# Patient Record
Sex: FEMALE | Race: WHITE | URBAN - METROPOLITAN AREA
[De-identification: names, ages, dates, MRNs, and addresses within clinical notes are randomized per-mention and may not be internally consistent; named-entity substitution may affect disease eponyms.]

---

## 2018-08-06 ENCOUNTER — OFFICE VISIT (OUTPATIENT)
Dept: FAMILY MEDICINE CLINIC | Age: 23
End: 2018-08-06

## 2018-08-06 VITALS
DIASTOLIC BLOOD PRESSURE: 85 MMHG | HEART RATE: 82 BPM | WEIGHT: 230 LBS | OXYGEN SATURATION: 99 % | TEMPERATURE: 98.1 F | BODY MASS INDEX: 45.16 KG/M2 | HEIGHT: 60 IN | RESPIRATION RATE: 14 BRPM | SYSTOLIC BLOOD PRESSURE: 118 MMHG

## 2018-08-06 DIAGNOSIS — F41.9 ANXIETY: ICD-10-CM

## 2018-08-06 DIAGNOSIS — Z00.00 WELL ADULT EXAM: Primary | ICD-10-CM

## 2018-08-06 PROBLEM — E66.01 OBESITY, MORBID (HCC): Status: ACTIVE | Noted: 2018-08-06

## 2018-08-06 RX ORDER — ESCITALOPRAM OXALATE 10 MG/1
10 TABLET ORAL DAILY
Qty: 30 TAB | Refills: 5 | Status: SHIPPED | OUTPATIENT
Start: 2018-08-06

## 2018-08-06 NOTE — MR AVS SNAPSHOT
[address] [address] 
[phone] Patient: Linda Cerrato MRN: [id number] Visit Information Date & Time Provider Department Dept. Phone Encounter #  
 8/6/2018 11:00 AM David Walter, [address] [facility] [phone] [id number] Follow-up Instructions Return in about 3 weeks (around 8/27/2018) for med check. Upcoming Health Maintenance Date Due  
 HPV Age 9Y-34Y (1 of 1 - Female 3 Dose Series) 9/24/2006 DTaP/Tdap/Td series (1 - Tdap) 9/24/2016 PAP AKA CERVICAL CYTOLOGY 9/24/2016 Influenza Age 5 to Adult 8/1/2018 Allergies as of 8/6/2018  Review Complete On: 8/6/2018 By: Pam De La Rosa Severity Noted Reaction Type Reactions Bee Venom Protein (Honey Bee) High 08/06/2018    Anaphylaxis Nystatin High 08/06/2018    Anaphylaxis Sac High 08/06/2018    Anaphylaxis Current Immunizations  Never Reviewed No immunizations on file. Not reviewed this visit You Were Diagnosed With   
  
 Codes Comments Well adult exam    -  Primary ICD-10-CM: Z00.00 ICD-9-CM: V70.0 Anxiety     ICD-10-CM: F41.9 ICD-9-CM: 300.00 Vitals BP Pulse Temp Resp Height(growth percentile) Weight(growth percentile) 118/85 (BP 1 Location: Left arm, BP Patient Position: Sitting) 82 98.1 °F (36.7 °C) (Oral) 14 5' (1.524 m) 230 lb (104.3 kg) SpO2 BMI OB Status Smoking Status 99% 44.92 kg/m2 IUD Never Smoker Vitals History BMI and BSA Data Body Mass Index Body Surface Area 44.92 kg/m 2 2.1 m 2 Preferred Pharmacy Pharmacy Name Phone [id number], [address] [phone] Your Updated Medication List  
  
   
This list is accurate as of 8/6/18 11:50 AM.  Always use your most recent med list.  
  
  
  
  
 escitalopram oxalate 10 mg tablet Commonly known as:  Genesis Regalado Take 1 Tab by mouth daily. Prescriptions Sent to Pharmacy Refills  
 escitalopram oxalate (LEXAPRO) 10 mg tablet 5 Sig: Take 1 Tab by mouth daily. Class: Normal  
 Pharmacy: [address] Ph #: [phone] Route: Oral  
  
We Performed the Following CBC WITH AUTOMATED DIFF [17909 CPT(R)] LIPID PANEL [22970 CPT(R)] METABOLIC PANEL, COMPREHENSIVE [28986 CPT(R)] TSH 3RD GENERATION [22450 CPT(R)] Follow-up Instructions Return in about 3 weeks (around 8/27/2018) for med check. Introducing [facility]! Wanda Linares introduces [employer] patient portal. Now you can access parts of your medical record, email your doctor's office, and request medication refills online. 1. In your internet browser, go to [email or web address] 2. Click on the First Time User? Click Here link in the Sign In box. You will see the New Member Sign Up page. 3. Enter your [employer] Access Code exactly as it appears below. You will not need to use this code after youve completed the sign-up process. If you do not sign up before the expiration date, you must request a new code. · [employer] Access Code: [email or web address] Expires: 11/4/2018 10:38 AM 
 
4. Enter the last four digits of your Social Security Number (xxxx) and Date of Birth (mm/dd/yyyy) as indicated and click Submit. You will be taken to the next sign-up page. 5. Create a [employer] ID. This will be your [employer] login ID and cannot be changed, so think of one that is secure and easy to remember. 6. Create a [employer] password. You can change your password at any time. 7. Enter your Password Reset Question and Answer. This can be used at a later time if you forget your password. 8. Enter your e-mail address. You will receive e-mail notification when new information is available in [address] 9. Click Sign Up. You can now view and download portions of your medical record. 10. Click the Download Summary menu link to download a portable copy of your medical information. If you have questions, please visit the Frequently Asked Questions section of the [employer] website. Remember, [employer] is NOT to be used for urgent needs. For medical emergencies, dial 911. Now available from your iPhone and Android! Please provide this summary of care documentation to your next provider. Your primary care clinician is listed as [facility]. If you have any questions after today's visit, please call [phone].

## 2018-08-06 NOTE — PROGRESS NOTES
Chief Complaint   Patient presents with   [facility] Establish Care     1. Have you been to the ER, urgent care clinic since your last visit? Hospitalized since your last visit? Yes, seen in the [facility] ER for left ear problem about a year ago. 2. Have you seen or consulted any other health care providers outside of the [facility] since your last visit? Include any pap smears or colon screening.  No

## 2018-08-06 NOTE — PROGRESS NOTES
Subjective:   25 y.o. female for Well Woman Check. She is new to the practice today. Her gyne and breast care is done elsewhere by her Ob-Gyne physician. Patient Active Problem List    Diagnosis Date Noted    Obesity, morbid ([address] 08/06/2018     Current Outpatient Prescriptions   Medication Sig Dispense Refill    escitalopram oxalate (LEXAPRO) 10 mg tablet Take 1 Tab by mouth daily. 27 Tab 5     Family History   Problem Relation Age of Onset    Hypertension Sister     High Cholesterol Sister     Diabetes Maternal Aunt     Diabetes Maternal Grandfather      Social History   Substance Use Topics    Smoking status: Never Smoker    Smokeless tobacco: Never Used    Alcohol use Yes      Comment: rarely        ROS: Feeling generally well. No TIA's or unusual headaches, no dysphagia. No prolonged cough. No dyspnea or chest pain on exertion. No abdominal pain, change in bowel habits, black or bloody stools. No urinary tract symptoms. No new or unusual musculoskeletal symptoms. Specific concerns today: long term anxiety, used to see Dr. Yue Gonzalez as child, only given benzo type meds, anxiety is bad, dont like to go out by herself. Objective: The patient appears well, alert, oriented x 3, in no distress. Visit Vitals    /85 (BP 1 Location: Left arm, BP Patient Position: Sitting)    Pulse 82    Temp 98.1 °F (36.7 °C) (Oral)    Resp 14    Ht 5' (1.524 m)    Wt 230 lb (104.3 kg)    SpO2 99%    BMI 44.92 kg/m2     ENT normal.  Neck supple. No adenopathy or thyromegaly. ROSSY. Lungs are clear, good air entry, no wheezes, rhonchi or rales. S1 and S2 normal, no murmurs, regular rate and rhythm. Abdomen soft without tenderness, guarding, mass or organomegaly. Extremities show no edema, normal peripheral pulses. Neurological is normal, no focal findings. Breast and Pelvic exams are deferred.     Assessment/Plan:   Well Woman  lose weight, increase physical activity, follow low fat diet, follow low salt diet, routine labs ordered  Encounter Diagnoses   Name Primary?  Well adult exam Yes    Anxiety      Orders Placed This Encounter    CBC WITH AUTOMATED DIFF    LIPID PANEL    METABOLIC PANEL, COMPREHENSIVE    TSH 3RD GENERATION    escitalopram oxalate (LEXAPRO) 10 mg tablet     Labs updated   Start lexapro 10mg a day  Reviewed how to take and what to expect, recheck 3 weeks    I have discussed the diagnosis with the patient and the intended plan as seen in the above orders. The patient has received an after-visit summary and questions were answered concerning future plans. Patient conveyed understanding of the plan at the time of the visit.     Katja Keating, MSN, ANP  8/6/2018

## 2018-08-07 LAB
ALBUMIN SERPL-MCNC: 4.6 G/DL (ref 3.5–5.5)
ALBUMIN/GLOB SERPL: 1.7 {RATIO} (ref 1.2–2.2)
ALP SERPL-CCNC: 103 IU/L (ref 39–117)
ALT SERPL-CCNC: 9 IU/L (ref 0–32)
AST SERPL-CCNC: 16 IU/L (ref 0–40)
BASOPHILS # BLD AUTO: 0.1 X10E3/UL (ref 0–0.2)
BASOPHILS NFR BLD AUTO: 1 %
BILIRUB SERPL-MCNC: 0.4 MG/DL (ref 0–1.2)
BUN SERPL-MCNC: 11 MG/DL (ref 6–20)
BUN/CREAT SERPL: 16 (ref 9–23)
CALCIUM SERPL-MCNC: 9.5 MG/DL (ref 8.7–10.2)
CHLORIDE SERPL-SCNC: 100 MMOL/L (ref 96–106)
CHOLEST SERPL-MCNC: 205 MG/DL (ref 100–199)
CO2 SERPL-SCNC: 22 MMOL/L (ref 20–29)
CREAT SERPL-MCNC: 0.67 MG/DL (ref 0.57–1)
EOSINOPHIL # BLD AUTO: 0 X10E3/UL (ref 0–0.4)
EOSINOPHIL NFR BLD AUTO: 0 %
ERYTHROCYTE [DISTWIDTH] IN BLOOD BY AUTOMATED COUNT: 12.9 % (ref 12.3–15.4)
GLOBULIN SER CALC-MCNC: 2.7 G/DL (ref 1.5–4.5)
GLUCOSE SERPL-MCNC: 86 MG/DL (ref 65–99)
HCT VFR BLD AUTO: 44.4 % (ref 34–46.6)
HDLC SERPL-MCNC: 50 MG/DL
HGB BLD-MCNC: 14.7 G/DL (ref 11.1–15.9)
IMM GRANULOCYTES # BLD: 0 X10E3/UL (ref 0–0.1)
IMM GRANULOCYTES NFR BLD: 0 %
INTERPRETATION, 910389: NORMAL
LDLC SERPL CALC-MCNC: 128 MG/DL (ref 0–99)
LYMPHOCYTES # BLD AUTO: 2.8 X10E3/UL (ref 0.7–3.1)
LYMPHOCYTES NFR BLD AUTO: 30 %
MCH RBC QN AUTO: 30.1 PG (ref 26.6–33)
MCHC RBC AUTO-ENTMCNC: 33.1 G/DL (ref 31.5–35.7)
MCV RBC AUTO: 91 FL (ref 79–97)
MONOCYTES # BLD AUTO: 0.7 X10E3/UL (ref 0.1–0.9)
MONOCYTES NFR BLD AUTO: 7 %
NEUTROPHILS # BLD AUTO: 5.7 X10E3/UL (ref 1.4–7)
NEUTROPHILS NFR BLD AUTO: 62 %
PLATELET # BLD AUTO: 296 X10E3/UL (ref 150–379)
POTASSIUM SERPL-SCNC: 4.3 MMOL/L (ref 3.5–5.2)
PROT SERPL-MCNC: 7.3 G/DL (ref 6–8.5)
RBC # BLD AUTO: 4.89 X10E6/UL (ref 3.77–5.28)
SODIUM SERPL-SCNC: 138 MMOL/L (ref 134–144)
TRIGL SERPL-MCNC: 137 MG/DL (ref 0–149)
TSH SERPL DL<=0.005 MIU/L-ACNC: 1.32 UIU/ML (ref 0.45–4.5)
VLDLC SERPL CALC-MCNC: 27 MG/DL (ref 5–40)
WBC # BLD AUTO: 9.3 X10E3/UL (ref 3.4–10.8)

## 2018-08-28 ENCOUNTER — OFFICE VISIT (OUTPATIENT)
Dept: FAMILY MEDICINE CLINIC | Age: 23
End: 2018-08-28

## 2018-08-28 VITALS
SYSTOLIC BLOOD PRESSURE: 116 MMHG | TEMPERATURE: 98.5 F | HEART RATE: 97 BPM | RESPIRATION RATE: 18 BRPM | HEIGHT: 60 IN | OXYGEN SATURATION: 100 % | DIASTOLIC BLOOD PRESSURE: 78 MMHG | BODY MASS INDEX: 45.9 KG/M2 | WEIGHT: 233.8 LBS

## 2018-08-28 DIAGNOSIS — F41.9 ANXIETY: Primary | ICD-10-CM

## 2018-08-28 NOTE — MR AVS SNAPSHOT
[address] [address] 
[phone] Patient: Betty Granado MRN: [id number] [id number]/93/[id number] Visit Information Date & Time Provider Department Dept. Phone Encounter #  
 8/28/2018  3:45 PM Chet Shelton, [address] [phone] [id number] Upcoming Health Maintenance Date Due  
 HPV Age 9Y-34Y (1 of 1 - Female 3 Dose Series) 9/24/2006 DTaP/Tdap/Td series (1 - Tdap) 9/24/2016 PAP AKA CERVICAL CYTOLOGY 9/24/2016 Influenza Age 5 to Adult 8/1/2018 Allergies as of 8/28/2018  Review Complete On: 8/28/2018 By: Dana Wesley LPN Severity Noted Reaction Type Reactions Bee Venom Protein (Honey Bee) High 08/06/2018    Anaphylaxis Nystatin High 08/06/2018    Anaphylaxis Swisher High 08/06/2018    Anaphylaxis Current Immunizations  Never Reviewed No immunizations on file. Not reviewed this visit You Were Diagnosed With   
  
 Codes Comments Anxiety    -  Primary ICD-10-CM: F41.9 ICD-9-CM: 300.00 Vitals BP Pulse Temp Resp Height(growth percentile) Weight(growth percentile) 116/78 (BP 1 Location: Left arm, BP Patient Position: Sitting) 97 98.5 °F (36.9 °C) (Oral) 18 5' (1.524 m) 233 lb 12.8 oz (106.1 kg) SpO2 BMI OB Status Smoking Status 100% 45.66 kg/m2 IUD Never Smoker BMI and BSA Data Body Mass Index Body Surface Area  
 45.66 kg/m 2 2.12 m 2 Preferred Pharmacy Pharmacy Name Phone [id number], [address] [facility] [phone] Your Updated Medication List  
  
   
This list is accurate as of 8/28/18  4:09 PM.  Always use your most recent med list.  
  
  
  
  
 escitalopram oxalate 10 mg tablet Commonly known as:  Elian Reynolds Take 1 Tab by mouth daily. Introducing [facility]! Dear Xiao: 
Thank you for requesting a [employer] account.   Our records indicate that you already have an active [employer] account. You can access your account anytime at [email or web address] Did you know that you can access your hospital and ER discharge instructions at any time in [employer]? You can also review all of your test results from your hospital stay or ER visit. Additional Information If you have questions, please visit the Frequently Asked Questions section of the [employer] website at [email or web address]/. Remember, [employer] is NOT to be used for urgent needs. For medical emergencies, dial 911. Now available from your iPhone and Android! Please provide this summary of care documentation to your next provider. Your primary care clinician is listed as Jessica Black. If you have any questions after today's visit, please call [phone].

## 2018-08-28 NOTE — PROGRESS NOTES
Rosa Colon is a 25 y.o. female    Chief Complaint   Patient presents with    Follow-up     3 week for new medication       1. Have you been to the ER, urgent care clinic since your last visit? Hospitalized since your last visit? No    2. Have you seen or consulted any other health care providers outside of the [address] [place] [address] since your last visit? Include any pap smears or colon screening.  No

## 2018-08-29 NOTE — PROGRESS NOTES
HISTORY OF PRESENT ILLNESS  Humble Knight is a 25 y.o. female. HPI  Started lexapro 10mg last visit  Does feel it has helped with sleep however must take it at night due to fatigue  Still having anxiety  This mostly stems from marriage and  who has been [place]  She would like to find counselor to help with her PTSD type sx    ROS  A comprehensive review of system was obtained and negative except findings in the HPI    Visit Vitals    /78 (BP 1 Location: Left arm, BP Patient Position: Sitting)    Pulse 97    Temp 98.5 °F (36.9 °C) (Oral)    Resp 18    Ht 5' (1.524 m)    Wt 233 lb 12.8 oz (106.1 kg)    SpO2 100%    BMI 45.66 kg/m2     Physical Exam   Constitutional: She is oriented to person, place, and time. She appears well-developed and well-nourished. Neck: No JVD present. Cardiovascular: Normal rate, regular rhythm and intact distal pulses. Exam reveals no gallop and no friction rub. No murmur heard. Pulmonary/Chest: Effort normal and breath sounds normal. No respiratory distress. She has no wheezes. Musculoskeletal: She exhibits no edema. Neurological: She is alert and oriented to person, place, and time. Skin: Skin is warm. Nursing note and vitals reviewed. ASSESSMENT and PLAN  Encounter Diagnoses   Name Primary?  Anxiety Yes     No orders of the defined types were placed in this encounter. No change in meds  Given name / number of Genesisgely  for counseling  Follow up prn    I have discussed the diagnosis with the patient and the intended plan as seen in the above orders. The patient has received an after-visit summary and questions were answered concerning future plans. Patient conveyed understanding of the plan at the time of the visit.     Flex Roberts, MSN, ANP  8/28/2018

## 2022-03-19 PROBLEM — E66.01 OBESITY, MORBID (HCC): Status: ACTIVE | Noted: 2018-08-06

## 2022-08-01 ENCOUNTER — OFFICE VISIT (OUTPATIENT)
Dept: FAMILY MEDICINE CLINIC | Age: 27
End: 2022-08-01
Payer: COMMERCIAL

## 2022-08-01 VITALS
BODY MASS INDEX: 46.13 KG/M2 | HEIGHT: 60 IN | WEIGHT: 235 LBS | DIASTOLIC BLOOD PRESSURE: 82 MMHG | RESPIRATION RATE: 16 BRPM | SYSTOLIC BLOOD PRESSURE: 114 MMHG | HEART RATE: 61 BPM | OXYGEN SATURATION: 99 %

## 2022-08-01 DIAGNOSIS — Z00.00 WELL ADULT EXAM: Primary | ICD-10-CM

## 2022-08-01 DIAGNOSIS — E66.01 MORBID OBESITY WITH BMI OF 45.0-49.9, ADULT (HCC): ICD-10-CM

## 2022-08-01 PROCEDURE — 99385 PREV VISIT NEW AGE 18-39: CPT | Performed by: NURSE PRACTITIONER

## 2022-08-01 RX ORDER — PHENTERMINE AND TOPIRAMATE 7.5; 46 MG/1; MG/1
1 CAPSULE, EXTENDED RELEASE ORAL EVERY MORNING
Qty: 30 CAPSULE | Refills: 0 | Status: SHIPPED | OUTPATIENT
Start: 2022-08-01

## 2022-08-01 RX ORDER — NORETHINDRONE ACETATE AND ETHINYL ESTRADIOL, AND FERROUS FUMARATE 1.5-30(21)
KIT ORAL
COMMUNITY
Start: 2022-05-25

## 2022-08-01 NOTE — PROGRESS NOTES
Verified name and birth date for privacy precautions. Chart reviewed in preparation for today's visit. Chief Complaint   Patient presents with    Annual Exam     Not fasting; Sees Demetra Poon NP for GYN care    Weight Management     Pt would like to discuss weight loss options. Health Maintenance Due   Topic    Hepatitis C Screening     Depression Screen     COVID-19 Vaccine (1)    HPV Age 9Y-34Y (1 - 2-dose series)    DTaP/Tdap/Td series (1 - Tdap)    Pap Smear          Wt Readings from Last 3 Encounters:   08/01/22 235 lb (106.6 kg)   08/28/18 233 lb 12.8 oz (106.1 kg)   08/06/18 230 lb (104.3 kg)     Temp Readings from Last 3 Encounters:   08/28/18 98.5 °F (36.9 °C) (Oral)   08/06/18 98.1 °F (36.7 °C) (Oral)     BP Readings from Last 3 Encounters:   08/01/22 114/82   08/28/18 116/78   08/06/18 118/85     Pulse Readings from Last 3 Encounters:   08/01/22 61   08/28/18 97   08/06/18 82         Learning Assessment:  :     Learning Assessment 8/6/2018   PRIMARY LEARNER Patient   HIGHEST LEVEL OF EDUCATION - PRIMARY LEARNER  > 4 YEARS OF COLLEGE   BARRIERS PRIMARY LEARNER NONE   CO-LEARNER CAREGIVER No   PRIMARY LANGUAGE ENGLISH   LEARNER PREFERENCE PRIMARY DEMONSTRATION     READING     LISTENING   ANSWERED BY Patient   RELATIONSHIP SELF       Depression Screening:  :     3 most recent PHQ Screens 8/1/2022   Little interest or pleasure in doing things Not at all   Feeling down, depressed, irritable, or hopeless Not at all   Total Score PHQ 2 0       Fall Risk Assessment:  :     No flowsheet data found. Abuse Screening:  :     Abuse Screening Questionnaire 8/1/2022 8/6/2018   Do you ever feel afraid of your partner? N N   Are you in a relationship with someone who physically or mentally threatens you? N N   Is it safe for you to go home?  Y Y       Coordination of Care Questionnaire:  :     1) Have you been to an emergency room, urgent care clinic since your last visit? no   Hospitalized since your last visit? no             2) Have you seen or consulted any other health care providers outside of [address] [place] [address] since your last visit? Yes, [facility] - Robert Pedraza NP (Include any pap smears or colon screenings in this section.)    3) Do you have an Advance Directive on file? no      Patient is currently accompanied by her self & I have received verbal consent from [facility] to discuss any/all medical information while he/she is present in the room.     ------------------------------------------------

## 2022-08-02 LAB
ALBUMIN SERPL-MCNC: 3.8 G/DL (ref 3.5–5)
ALBUMIN/GLOB SERPL: 1.2 {RATIO} (ref 1.1–2.2)
ALP SERPL-CCNC: 80 U/L (ref 45–117)
ALT SERPL-CCNC: 22 U/L (ref 12–78)
ANION GAP SERPL CALC-SCNC: 8 MMOL/L (ref 5–15)
AST SERPL-CCNC: 16 U/L (ref 15–37)
BASOPHILS # BLD: 0.1 K/UL (ref 0–0.1)
BASOPHILS NFR BLD: 1 % (ref 0–1)
BILIRUB SERPL-MCNC: 0.3 MG/DL (ref 0.2–1)
BUN SERPL-MCNC: 10 MG/DL (ref 6–20)
BUN/CREAT SERPL: 14 (ref 12–20)
CALCIUM SERPL-MCNC: 9.2 MG/DL (ref 8.5–10.1)
CHLORIDE SERPL-SCNC: 105 MMOL/L (ref 97–108)
CHOLEST SERPL-MCNC: 249 MG/DL
CO2 SERPL-SCNC: 24 MMOL/L (ref 21–32)
CREAT SERPL-MCNC: 0.74 MG/DL (ref 0.55–1.02)
DIFFERENTIAL METHOD BLD: ABNORMAL
EOSINOPHIL # BLD: 0.1 K/UL (ref 0–0.4)
EOSINOPHIL NFR BLD: 1 % (ref 0–7)
ERYTHROCYTE [DISTWIDTH] IN BLOOD BY AUTOMATED COUNT: 13 % (ref 11.5–14.5)
GLOBULIN SER CALC-MCNC: 3.3 G/DL (ref 2–4)
GLUCOSE SERPL-MCNC: 84 MG/DL (ref 65–100)
HCT VFR BLD AUTO: 47.2 % (ref 35–47)
HDLC SERPL-MCNC: 50 MG/DL
HDLC SERPL: 5 {RATIO} (ref 0–5)
HGB BLD-MCNC: 14.9 G/DL (ref 11.5–16)
IMM GRANULOCYTES # BLD AUTO: 0 K/UL (ref 0–0.04)
IMM GRANULOCYTES NFR BLD AUTO: 0 % (ref 0–0.5)
LDLC SERPL CALC-MCNC: 172.4 MG/DL (ref 0–100)
LYMPHOCYTES # BLD: 3.7 K/UL (ref 0.8–3.5)
LYMPHOCYTES NFR BLD: 44 % (ref 12–49)
MCH RBC QN AUTO: 30.3 PG (ref 26–34)
MCHC RBC AUTO-ENTMCNC: 31.6 G/DL (ref 30–36.5)
MCV RBC AUTO: 95.9 FL (ref 80–99)
MONOCYTES # BLD: 0.6 K/UL (ref 0–1)
MONOCYTES NFR BLD: 7 % (ref 5–13)
NEUTS SEG # BLD: 4 K/UL (ref 1.8–8)
NEUTS SEG NFR BLD: 47 % (ref 32–75)
NRBC # BLD: 0 K/UL (ref 0–0.01)
NRBC BLD-RTO: 0 PER 100 WBC
PLATELET # BLD AUTO: 316 K/UL (ref 150–400)
PMV BLD AUTO: 9.5 FL (ref 8.9–12.9)
POTASSIUM SERPL-SCNC: 3.8 MMOL/L (ref 3.5–5.1)
PROT SERPL-MCNC: 7.1 G/DL (ref 6.4–8.2)
RBC # BLD AUTO: 4.92 M/UL (ref 3.8–5.2)
SODIUM SERPL-SCNC: 137 MMOL/L (ref 136–145)
TRIGL SERPL-MCNC: 133 MG/DL (ref ?–150)
TSH SERPL DL<=0.05 MIU/L-ACNC: 1.47 UIU/ML (ref 0.36–3.74)
VLDLC SERPL CALC-MCNC: 26.6 MG/DL
WBC # BLD AUTO: 8.4 K/UL (ref 3.6–11)

## 2022-08-08 NOTE — PROGRESS NOTES
Your cholesterol has gone lyndsay high. You have got to work on diet to get this down. Watch the diet for red meat/pork, dairy products, and fried/fast foods.     Recheck 1 year, Marni Farias

## 2022-08-17 NOTE — PROGRESS NOTES
Subjective:   32 y.o. female for Well Woman Check. Her gyne and breast care is done elsewhere by her Ob-Gyne physician. Patient Active Problem List    Diagnosis Date Noted    Obesity, morbid ([address] 08/06/2018     Current Outpatient Medications   Medication Sig Dispense Refill    Junel FE 1.5/30, 28, 1.5 mg-30 mcg (21)/75 mg (7) tab TAKE 1 TABLET BY MOUTH EVERY DAY FOR 90 DAYS      phentermine-topiramate (Qsymia) 7.5-46 mg CM24 Take 1 Caplet by mouth Every morning. Max Daily Amount: 1 Caplet. 30 Capsule 0    escitalopram oxalate (LEXAPRO) 10 mg tablet Take 1 Tab by mouth daily. 27 Tab 5     Family History   Problem Relation Age of Onset    Hypertension Sister     High Cholesterol Sister     Diabetes Maternal Aunt     Hypertension Maternal Aunt     Diabetes Maternal Grandfather     Cancer Maternal Grandfather         Pancreatic     Social History     Tobacco Use    Smoking status: Never    Smokeless tobacco: Never   Substance Use Topics    Alcohol use: Yes     Comment: rarely             ROS: Feeling generally well. No TIA's or unusual headaches, no dysphagia. No prolonged cough. No dyspnea or chest pain on exertion. No abdominal pain, change in bowel habits, black or bloody stools. No urinary tract symptoms. No new or unusual musculoskeletal symptoms. Specific concerns today: wants to discuss weight loss management med options. Objective: The patient appears well, alert, oriented x 3, in no distress. Visit Vitals  /82 (BP 1 Location: Left upper arm, BP Patient Position: Sitting, BP Cuff Size: Large adult)   Pulse 61   Resp 16   Ht 5' (1.524 m)   Wt 235 lb (106.6 kg)   SpO2 99%   BMI 45.90 kg/m²     ENT normal.  Neck supple. No adenopathy or thyromegaly. ROSSY. Lungs are clear, good air entry, no wheezes, rhonchi or rales. S1 and S2 normal, no murmurs, regular rate and rhythm. Abdomen soft without tenderness, guarding, mass or organomegaly.  Extremities show no edema, normal peripheral pulses. Neurological is normal, no focal findings. Breast and Pelvic exams are deferred. Assessment/Plan:   Well Woman  lose weight, increase physical activity, follow low fat diet, follow low salt diet, routine labs ordered  Encounter Diagnoses   Name Primary?  Well adult exam Yes    Morbid obesity with BMI of 45.0-49.9, adult ([facility] [address] [facility]      Orders Placed This Encounter    CBC WITH AUTOMATED DIFF    METABOLIC PANEL, COMPREHENSIVE    TSH 3RD GENERATION    LIPID PANEL    Junel FE 1.5/30, 28, 1.5 mg-30 mcg (21)/75 mg (7) tab    phentermine-topiramate (Qsymia) 7.5-46 mg CM24     Reviewed use of qsymia, how to take and what to expect  Follow up 4 weeks  I have discussed the diagnosis with the patient and the intended plan as seen in the above orders. The patient has received an after-visit summary and questions were answered concerning future plans. Patient conveyed understanding of the plan at the time of the visit.     Richard Desir, MSN, ANP  8/16/2022

## 2023-05-16 RX ORDER — PHENTERMINE AND TOPIRAMATE 7.5; 46 MG/1; MG/1
CAPSULE, EXTENDED RELEASE ORAL EVERY MORNING
COMMUNITY
Start: 2022-08-01

## 2023-05-16 RX ORDER — NORETHINDRONE ACETATE AND ETHINYL ESTRADIOL 1.5-30(21)
KIT ORAL
COMMUNITY
Start: 2022-05-25

## 2023-05-16 RX ORDER — ESCITALOPRAM OXALATE 10 MG/1
10 TABLET ORAL DAILY
COMMUNITY
Start: 2018-08-06